# Patient Record
Sex: MALE | Race: WHITE | Employment: FULL TIME | URBAN - METROPOLITAN AREA
[De-identification: names, ages, dates, MRNs, and addresses within clinical notes are randomized per-mention and may not be internally consistent; named-entity substitution may affect disease eponyms.]

---

## 2024-06-12 ENCOUNTER — OFFICE VISIT (OUTPATIENT)
Dept: FAMILY MEDICINE CLINIC | Facility: CLINIC | Age: 24
End: 2024-06-12
Payer: COMMERCIAL

## 2024-06-12 VITALS
HEIGHT: 77 IN | TEMPERATURE: 99 F | HEART RATE: 85 BPM | OXYGEN SATURATION: 97 % | WEIGHT: 194 LBS | RESPIRATION RATE: 14 BRPM | DIASTOLIC BLOOD PRESSURE: 80 MMHG | SYSTOLIC BLOOD PRESSURE: 106 MMHG | BODY MASS INDEX: 22.91 KG/M2

## 2024-06-12 DIAGNOSIS — F41.9 ANXIETY: ICD-10-CM

## 2024-06-12 DIAGNOSIS — Z00.00 WELLNESS EXAMINATION: Primary | ICD-10-CM

## 2024-06-12 DIAGNOSIS — Z13.220 LIPID SCREENING: ICD-10-CM

## 2024-06-12 DIAGNOSIS — Z72.51 HIGH RISK SEXUAL BEHAVIOR, UNSPECIFIED TYPE: ICD-10-CM

## 2024-06-12 DIAGNOSIS — R05.3 CHRONIC COUGH: ICD-10-CM

## 2024-06-12 PROCEDURE — 90471 IMMUNIZATION ADMIN: CPT | Performed by: FAMILY MEDICINE

## 2024-06-12 PROCEDURE — 99385 PREV VISIT NEW AGE 18-39: CPT | Performed by: FAMILY MEDICINE

## 2024-06-12 PROCEDURE — 3074F SYST BP LT 130 MM HG: CPT | Performed by: FAMILY MEDICINE

## 2024-06-12 PROCEDURE — 87491 CHLMYD TRACH DNA AMP PROBE: CPT | Performed by: FAMILY MEDICINE

## 2024-06-12 PROCEDURE — 3008F BODY MASS INDEX DOCD: CPT | Performed by: FAMILY MEDICINE

## 2024-06-12 PROCEDURE — 3079F DIAST BP 80-89 MM HG: CPT | Performed by: FAMILY MEDICINE

## 2024-06-12 PROCEDURE — 99213 OFFICE O/P EST LOW 20 MIN: CPT | Performed by: FAMILY MEDICINE

## 2024-06-12 PROCEDURE — 87591 N.GONORRHOEAE DNA AMP PROB: CPT | Performed by: FAMILY MEDICINE

## 2024-06-12 PROCEDURE — 90632 HEPA VACCINE ADULT IM: CPT | Performed by: FAMILY MEDICINE

## 2024-06-12 RX ORDER — BUDESONIDE 0.5 MG/2ML
0.5 INHALANT ORAL 2 TIMES DAILY
COMMUNITY
Start: 2023-01-10

## 2024-06-12 RX ORDER — IBUPROFEN 200 MG
400 TABLET ORAL EVERY 6 HOURS PRN
COMMUNITY

## 2024-06-12 RX ORDER — CETIRIZINE HYDROCHLORIDE 1 MG/ML
SOLUTION ORAL AS DIRECTED
COMMUNITY

## 2024-06-12 RX ORDER — ESCITALOPRAM OXALATE 10 MG/1
10 TABLET ORAL DAILY
Qty: 30 TABLET | Refills: 2 | Status: SHIPPED | OUTPATIENT
Start: 2024-06-12

## 2024-06-12 RX ORDER — ALBUTEROL SULFATE 90 UG/1
1-2 AEROSOL, METERED RESPIRATORY (INHALATION) EVERY 4 HOURS PRN
COMMUNITY
Start: 2022-10-07

## 2024-06-12 RX ORDER — FLUTICASONE PROPIONATE 44 UG/1
2 AEROSOL, METERED RESPIRATORY (INHALATION) 2 TIMES DAILY
COMMUNITY
Start: 2024-03-20

## 2024-06-12 RX ORDER — BIOTIN 1000 MCG
TABLET,CHEWABLE ORAL AS DIRECTED
COMMUNITY

## 2024-06-12 NOTE — PROGRESS NOTES
Uriel Long is a 23 year old male.   Chief Complaint   Patient presents with    Physical     New patient, establish care     HPI:1.  Cpx: sees same sex partner, sti screen, chronic cough ongoing and wants to visit with pulmonology    Signficant issues and anxiety, has been on anxiolitic in the past and not work well, no ht, st, would like to meet with therapist and give safe medicine a try.     Past Medical History:    Asthma (HCC)       Past Surgical History:   Procedure Laterality Date    Other surgical history Bilateral 02/27/2024    blilateral ETHOIDECTOMY       Family History   Problem Relation Age of Onset    Fibromyalgia Mother     Cancer Maternal Grandfather     Other (Other) Paternal Grandfather        Social History:    Social History     Tobacco Use    Smoking status: Former     Types: Cigarettes    Smokeless tobacco: Never    Tobacco comments:     Quit 2yrs ago   Vaping Use    Vaping status: Never Used   Substance and Sexual Activity    Alcohol use: Yes     Comment: occ    Drug use: Never       ALLERGIES:  Allergies   Allergen Reactions    Cephalexin RASH    Penicillin G RASH      CURRENT MEDS:  Current Outpatient Medications   Medication Sig Dispense Refill    Acetaminophen 500 MG Oral Cap Take by mouth as needed.      cetirizine 1 MG/ML Oral Solution Take by mouth As Directed.      albuterol 108 (90 Base) MCG/ACT Inhalation Aero Soln Inhale 1-2 puffs into the lungs every 4 (four) hours as needed.      Biotin 1000 MCG Oral Chew Tab Chew by mouth As Directed.      budesonide 0.5 MG/2ML Inhalation Suspension Inhale 2 mL (0.5 mg total) into the lungs 2 (two) times daily.      cyanocobalamin 100 MCG Oral Tab Take 1 tablet (100 mcg total) by mouth daily.      fluticasone propionate 44 MCG/ACT Inhalation Aerosol Inhale 2 puffs into the lungs 2 (two) times daily.      ibuprofen 200 MG Oral Tab Take 2 tablets (400 mg total) by mouth every 6 (six) hours as needed.      escitalopram (LEXAPRO) 10 MG Oral Tab Take  1 tablet (10 mg total) by mouth daily. 30 tablet 2        REVIEW OF SYSTEMS:   GENERAL HEALTH: Feels well overall  SKIN: Denies any unusual skin lesions or rashes  EYES: No visual complaints or deficits  HEENT: Denies nasal congestion, sinus pain or sore throat; hearing loss negative  RESPIRATORY: Denies shortness of breath, wheezing or cough   CARDIOVASCULAR: Denies chest pain or MARIN; no palpitations   GI: Denies nausea, vomiting, constipation, diarrhea; no rectal bleeding; no heartburn  GENITAL/: No urinary symptoms  MUSCULOSKELETAL: No joint complaints upper or lower extremities  NEURO: No sensory or motor complaint  PSYCHE: No symptoms of depression or anxiety  HEMATOLOGY: No easy bleeding, bruising,   ENDOCRINE: No thyroid symptoms      PHYSICAL EXAM:   GENERAL: Well developed, well nourished, in no apparent distress, A&O X 3  SKIN: No rashes, no suspicious lesions  EYES: PERRL, EOMI, sclera anicteric, conjunctiva normal  HEENT: Normocephalic; normal nose, pharynx and TM's  NECK: supple; Full range of motion , no JVD, thyroid not enlarged, no carotid bruits  RESPIRATORY: Bilaterally  clear to auscultation, no rales, no wheezing, good A/E bilaterally  CARDIOVASCULAR: S1, S2 normal, RRR; no S3, no S4; no click; no murmur  ABDOMEN:  Soft, nondistended; no HSM; no masses; nontender, no guarding  GENITAL/: deferred   RECTAL:defered  LYMPHATIC: No lymphadenopathy  MUSCULOSKELETAL: Full painless ROM of U/E and L/E joints,no joint effusion  EXTREMITIES: No cyanosis, clubbing or edema, peripheral pulses intact  NEUROLOGIC:Motor power 5/5 all over, Cranial nerves 2-12: unremarcable; no sensory deficits  PSYCHIATRIC: Alert and oriented x 3; affect appropriate    ASSESSMENT/PLAN:     Diagnoses and all orders for this visit:    Wellness examination  -     Comp Metabolic Panel (14); Future  -     Lipid Panel; Future  -     Chlamydia/Gc Amplification [E]; Future    Lipid screening  -     Lipid Panel; Future    Anxiety  -      OFFICE/OUTPT VISIT,DARIAN NICHOLS III  -     [facility] Referral - In Network    High risk sexual behavior, unspecified type  -     HIV AG AB Combo [E]; Future    Chronic cough  -     Pulmonary Referral - [facility] (Rory BRANDT)    Other orders  -     escitalopram (LEXAPRO) 10 MG Oral Tab; Take 1 tablet (10 mg total) by mouth daily.  -     Hep A, Adult [39091]      Discussed preventative care, fasting labs    Long discussion with patient for mental health, trial lexapro and recommended therapist, will connect w [facility] inhouse  Sti screen due to high risk   Hep A vaccine       The patient verbalizes understanding of diagnosis, treatment plan and agrees to the plan of care.

## 2024-06-13 LAB
C TRACH DNA SPEC QL NAA+PROBE: NEGATIVE
N GONORRHOEA DNA SPEC QL NAA+PROBE: NEGATIVE

## 2024-07-30 ENCOUNTER — TELEPHONE (OUTPATIENT)
Dept: FAMILY MEDICINE CLINIC | Facility: CLINIC | Age: 24
End: 2024-07-30

## 2024-07-30 NOTE — TELEPHONE ENCOUNTER
[id number] - [place], [place] - [address] AT [facility] [address], [phone], [phone]   [address] [facility] [address] [facility] [address] [facility] [address]   Phone: [phone] Fax: [phone]       PATIENT WALKED IN OFFICE TODAY.  PATIENT SAYS HE HAS BACK PAIN AND ASKING IF DR REAGAN CAN RX A MUSCLE RELAXER FOR HIM TO HELP HIM SLEEP.  PATIENT REQUESTING

## 2024-07-30 NOTE — TELEPHONE ENCOUNTER
Spoke with patient who states he is not sure what he did, maybe twisted wrong.  Taking ibuprofen,diazepam (?) and icy hot to sleep.   Advised will need evaluation.    Future Appointments   Date Time Provider Department Center   7/31/2024  3:00 PM Syed Quiles MD [facility] [place]

## 2024-08-07 RX ORDER — ESCITALOPRAM OXALATE 10 MG/1
10 TABLET ORAL DAILY
Qty: 90 TABLET | Refills: 0 | Status: SHIPPED | OUTPATIENT
Start: 2024-08-07 | End: 2024-11-05

## 2024-08-07 NOTE — TELEPHONE ENCOUNTER
Please see note below    LOV/well exam 06/12/24  Last refill on 06/12/24, for #30 tabs, with 2 refills  escitalopram (LEXAPRO) 10 MG Oral Tab     No future appointments.    Order(s) pending, please review. Thank you.

## 2024-08-07 NOTE — TELEPHONE ENCOUNTER
PT CALLED AND ADV THAT HE HAS HAD TO MOVE  TO HOME.  PT ADV IS 2 HOURS AWAY.    PT ADV LOST HIS JOB AND INSURANCE, PT ADV HAS STARTED A NEW JOB BUT WILL NOT HAVE INSURANCE UNTIL NOVEMBER     PT WONDERING IF  CAN SEND 90 DAY SCRIPT OF :     escitalopram (LEXAPRO) 10 MG Oral Tab     PLEASE SEND TO [facility] [place]    PLEASE ADV    THANK YOU

## 2024-10-21 RX ORDER — ESCITALOPRAM OXALATE 10 MG/1
10 TABLET ORAL DAILY
Qty: 90 TABLET | Refills: 0 | Status: SHIPPED | OUTPATIENT
Start: 2024-10-21 | End: 2025-01-19

## 2024-10-21 NOTE — TELEPHONE ENCOUNTER
Requested Renewals       Name from pharmacy: ESCITALOPRAM OX 10 MG TAB 10 Tablet         Will file in chart as: ESCITALOPRAM 10 MG Oral Tab    Sig: Take 1 tablet (10 mg total) by mouth daily.    Disp: 90 tablet    Refills: 0    Start: 10/21/2024    Class: Normal    Non-formulary    Last ordered: 2 months ago (8/7/2024) by Syed Nieves MD    Last refill: 8/7/2024    Rx #: [id number]    Psychiatric Non-Scheduled (Anti-Anxiety) Susgby05/21/2024 11:10 AM   Protocol Details In person appointment or virtual visit in the past 6 mos or appointment in next 3 mos    Depression Screening completed within the past 12 months      To be filled at: [address] [phone], [phone]               Last refill 8/7/24    LOV 6/12/24    No future appointments.      Medication past protocol     Medication sent

## 2025-01-28 RX ORDER — ESCITALOPRAM OXALATE 10 MG/1
10 TABLET ORAL DAILY
Qty: 90 TABLET | Refills: 0 | Status: SHIPPED | OUTPATIENT
Start: 2025-01-28 | End: 2025-04-28

## 2025-01-28 NOTE — TELEPHONE ENCOUNTER
[facility] [address] [place] [address] - [address] [facility] [phone], [phone] [address] [facility] [address]   Phone: [phone] Fax: [phone]   Hours: Not open 24 hours       PATIENT REQUESTING REFILL FOR ESCITALOPRAM 10MG every day.  PATIENT IS SELF PAY.

## 2025-01-29 NOTE — TELEPHONE ENCOUNTER
[facility] [address] [place] [address] - [address] [facility] [phone], [phone] [address] [facility] [address]   Phone: [phone] Fax: [phone]   Hours: Not open 24 hours     MEDICATION SENT TO WRONG PHARMACY          escitalopram 10 MG Oral Tab 90 tablet 0 1/28/2025 4/28/2025   Sig:   Take 1 tablet (10 mg total) by mouth daily.     Route:   Oral

## 2025-03-25 ENCOUNTER — TELEPHONE (OUTPATIENT)
Dept: FAMILY MEDICINE CLINIC | Facility: CLINIC | Age: 25
End: 2025-03-25

## 2025-03-25 NOTE — TELEPHONE ENCOUNTER
Patient states there was a stomach bug at his work.  He has had diarrhea for the past week. Going multiple times a day.   So far today has gone 6-7 times  Has had 5 stool accidents this week.  States no fever or blood in stool. Stomach feels bloated, no pain.  Patient is fatigued.  Patient felt a little better yesterday. Had some heartburn so drank a glass of milk and ate a cheese burger before bed. Had vomiting and diarrhea today.  The only thing he has had today is water and Pedialyte   Patient is looking for recommendations. Is living in [place] now.    Discussed with patient to avoid dairy, no milk or cheese. Avoid greasy and fried foods as well. Stick to bland diet, things like toast, crackers and broth.  Advised this has been going on for a week and may need stool testing but would need to be seen at a [facility] in his area and pay out of pocket.     Aware message is being forwarded to Dr Mcnamara to see if any further recommendations.

## 2025-03-25 NOTE — TELEPHONE ENCOUNTER
PT CALLED AND ADV HAS HAD RUNNY STOOLS X 7 DAYS - PT ADV HAS NOT BEEN ABLE TO HOLD IT AND HAS HAD 5 ACCIDENTS.    PT ADV WAS ONLY TO KEEP DOWN PEDIALYTE AND YESTERDAY FELT A LITTLE BETTER AND HAD MILK AND A CHEESEBURGER.  PT WOKE UP VOMITING AND MORE DIARRHEA.    PT ADV DOESN'T HAVE INSURANCE AND MOVED DOWN TO [place].    ADV PT THAT THERE MAY NOT BE MUCH THAT WE CAN DO.    PLEASE ADV    THANK YOU

## 2025-03-25 NOTE — TELEPHONE ENCOUNTER
Patient notified and verbalized understanding.   Discussed if he notices any dizziness would indicate dehydration.  Again discussed bland/brat diet and clear fluids.    Patient states he is in the [place] area and will try to find a [facility] to be seen at for evaluation.

## 2025-04-30 RX ORDER — ESCITALOPRAM OXALATE 10 MG/1
10 TABLET ORAL DAILY
Qty: 90 TABLET | Refills: 0 | Status: SHIPPED | OUTPATIENT
Start: 2025-04-30

## 2025-04-30 NOTE — TELEPHONE ENCOUNTER
Escitalopram Oxalate 10 MG Oral Tablet     Pt failed refill protocol for the following reasons:    Psychiatric Non-Scheduled (Anti-Anxiety) [id number]/30/2025 12:03 PM   Protocol Details In person appointment or virtual visit in the past 6 mos or appointment in next 3 mos    Depression Screening completed within the past 12 months    Medication is active on med list      Last refill: 1/28/2025, for #90, 0 refill  Last appt: 6/12/2024  Next appt: No future appointments.      Forward to Dr. Syed Nieves, please advise on refills. Thank you.

## 2025-05-21 ENCOUNTER — TELEPHONE (OUTPATIENT)
Dept: FAMILY MEDICINE CLINIC | Facility: CLINIC | Age: 25
End: 2025-05-21

## 2025-05-21 RX ORDER — ESCITALOPRAM OXALATE 10 MG/1
10 TABLET ORAL DAILY
Qty: 30 TABLET | Refills: 0 | Status: SHIPPED | OUTPATIENT
Start: 2025-05-21

## 2025-05-21 RX ORDER — ESCITALOPRAM OXALATE 10 MG/1
20 TABLET ORAL DAILY
Qty: 30 TABLET | Refills: 0 | Status: SHIPPED | OUTPATIENT
Start: 2025-05-21 | End: 2025-05-21

## 2025-05-21 NOTE — TELEPHONE ENCOUNTER
Advised patient of Dr. Mcnamara's notes below. Patient verbalized understanding. No further questions at this time.

## 2025-05-21 NOTE — TELEPHONE ENCOUNTER
Please see note below    Called and spoke with patient - advised patient that appt usually required for request to increase medication dose - he verbalized understanding and states he has moved to [place] recently. Has not yet established care with new PCP at this time.    Advised patient will notify Dr. Mcnamara - he verbalized understanding. No further questions at this time.      Patient asking if can increase dose of ESCITALOPRAM 10 MG Oral Tab?     LOV/Wellness exam 06/12/24  Last refill on 04/30/25, for #90 tabs, with 0 refills  ESCITALOPRAM 10 MG Oral Tab   (Sent to [place] pharmacy)    No future appointments.    Order(s) pending, please review. Thank you.  John Club, [place], [place]

## 2025-05-21 NOTE — TELEPHONE ENCOUNTER
Pt calling- would like dose increase on ESCITALOPRAM 10 MG Oral Tab. Asking if he can take 2 tabs instead of one. Unable to schedule appt as pt has moved to [place].           [facility] [address] [place] [address] [address] [facility] [phone], [phone]